# Patient Record
Sex: FEMALE | URBAN - METROPOLITAN AREA
[De-identification: names, ages, dates, MRNs, and addresses within clinical notes are randomized per-mention and may not be internally consistent; named-entity substitution may affect disease eponyms.]

---

## 2020-11-18 ENCOUNTER — VIRTUAL VISIT (OUTPATIENT)
Dept: ENDOCRINOLOGY | Age: 40
End: 2020-11-18
Payer: COMMERCIAL

## 2020-11-18 DIAGNOSIS — L68.0 HIRSUTISM: ICD-10-CM

## 2020-11-18 DIAGNOSIS — E22.1 HYPERPROLACTINEMIA (HCC): Primary | ICD-10-CM

## 2020-11-18 PROCEDURE — 99245 OFF/OP CONSLTJ NEW/EST HI 55: CPT | Performed by: INTERNAL MEDICINE

## 2020-11-18 RX ORDER — DEXAMETHASONE 1 MG/1
1 TABLET ORAL ONCE
Qty: 1 TAB | Refills: 0 | Status: SHIPPED | OUTPATIENT
Start: 2020-11-18 | End: 2020-11-18

## 2020-11-18 RX ORDER — MELATONIN 5 MG
5 CAPSULE ORAL
COMMUNITY

## 2020-11-18 RX ORDER — BISMUTH SUBSALICYLATE 262 MG
1 TABLET,CHEWABLE ORAL DAILY
COMMUNITY
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
## 2020-12-03 LAB
ACTH PLAS-MCNC: <1.5 PG/ML (ref 7.2–63.3)
CORTIS AM PEAK SERPL-MCNC: 0.4 UG/DL (ref 6.2–19.4)
DHEA-S SERPL-MCNC: 166 UG/DL (ref 57.3–279.2)
ESTRADIOL SERPL-MCNC: 149 PG/ML
ESTRONE SERPL-MCNC: 160 PG/ML
FSH SERPL-ACNC: 4.2 MIU/ML
IGF-I SERPL-MCNC: 127 NG/ML
LH SERPL-ACNC: 10.5 MIU/ML
PROLACTIN SERPL-MCNC: 36.5 NG/ML (ref 4.8–23.3)
T4 FREE SERPL-MCNC: 1.2 NG/DL (ref 0.82–1.77)
TESTOST SERPL-MCNC: 26.1 NG/DL (ref 10–55)
TSH SERPL DL<=0.005 MIU/L-ACNC: 0.99 UIU/ML (ref 0.45–4.5)

## 2020-12-11 NOTE — PROGRESS NOTES
Spoke with pt regarding her labs. The pituitary labs were normal, with the exception of a mildly elevated proactin. Her testosterone and DHEA-s were not elevated. No evidence of cushing, acromegaly or thyroid abnormalities.   MRI pending